# Patient Record
(demographics unavailable — no encounter records)

---

## 2025-01-10 NOTE — PROCEDURE
[Large Joint Injection] : Large joint injection [Bilateral] : bilaterally of the [Knee] : knee [Pain] : pain [Inflammation] : inflammation [X-ray evidence of Osteoarthritis on this or prior visit] : x-ray evidence of Osteoarthritis on this or prior visit [Alcohol] : alcohol [Betadine] : betadine [Ethyl Chloride sprayed topically] : ethyl chloride sprayed topically [Sterile technique used] : sterile technique used [___ cc    0.5%] : Bupivacaine (Marcaine) ~Vcc of 0.5%  [___ cc    40mg] : Triamcinolone (Kenalog) ~Vcc of 40 mg  [Call if redness, pain or fever occur] : call if redness, pain or fever occur [Apply ice for 15min out of every hour for the next 12-24 hours as tolerated] : apply ice for 15 minutes out of every hour for the next 12-24 hours as tolerated [Previous OTC use and PT nontherapeutic] : patient has tried OTC's including aspirin, Ibuprofen, Aleve, etc or prescription NSAIDS, and/or exercises at home and/or physical therapy without satisfactory response [Patient had decreased mobility in the joint] : patient had decreased mobility in the joint [Risks, benefits, alternatives discussed / Verbal consent obtained] : the risks benefits, and alternatives have been discussed, and verbal consent was obtained

## 2025-01-13 NOTE — ASSESSMENT
[FreeTextEntry1] : 67F p/w george knee and R shoulder OA  CSI BL knees  will get auth for gel series return to begin  The risks, benefits, contents and alternatives to injection were explained in full to the patient. Risks outlined include but are not limited to infection, sepsis, bleeding, scarring, skin discoloration, temporary increase in pain, syncopal episode, failure to resolve symptoms, allergic reaction, flare reaction, permanent white skin discoloration, symptom recurrence, and elevation of blood sugar in diabetics. Patient understood the risks. All questions were answered. After discussion of options, patient requested an injection. Oral informed consent was obtained and sterile prep was done of the injection site. Sterile technique was used to introduce the mixture. Patient tolerated the procedure well. Patient advised to ice the injection site this evening. Signs and symptoms of infection reviewed and patient advised to call immediately for redness, fevers, and/or chills.

## 2025-01-13 NOTE — HISTORY OF PRESENT ILLNESS
[8] : 8 [7] : 7 [de-identified] : 7/1/22: Euflexxa #1 BL knees  AJ KERVIN 67 year old F here for eval of Juan M-knees, Pt state they feel better since last visit. Decrease in pain 67F f/u juan m knees, last seen in feb and wanted another round of visco but was too soon. She would like to  try another CSI    01/10/2025: pt is here for follow up of LT knee pain. States she is still having pain within the knee [FreeTextEntry1] : george knees

## 2025-01-13 NOTE — HISTORY OF PRESENT ILLNESS
[8] : 8 [7] : 7 [de-identified] : 7/1/22: Euflexxa #1 BL knees  AJ KERVIN 67 year old F here for eval of Juan M-knees, Pt state they feel better since last visit. Decrease in pain 67F f/u juan m knees, last seen in feb and wanted another round of visco but was too soon. She would like to  try another CSI    01/10/2025: pt is here for follow up of LT knee pain. States she is still having pain within the knee [FreeTextEntry1] : george knees

## 2025-01-13 NOTE — PHYSICAL EXAM
[Normal Sensation] : normal sensation [Normal Mood and Affect] : normal mood and affect [Oriented] : oriented [Able to Communicate] : able to communicate [Well Developed] : well developed [Well Nourished] : well nourished [Right] : right shoulder [Degenerative change] : Degenerative change [Bilateral] : knee bilaterally [NL (140)] : flexion 140 degrees [NL (0)] : extension 0 degrees [5___] : hamstring 5[unfilled]/5 [] : negative Lachmann

## 2025-01-31 NOTE — DISCUSSION/SUMMARY
[de-identified] : The patient was advised of the diagnosis.  The natural history of the pathology was explained in full to the patient in layman's terms. All questions were answered.  The risks and benefits of surgical and non-surgical treatment alternatives were explained in full to the patient.  The natural progression of Osteoarthritis was explained to the patient.  We discussed the possible treatment options from conservative to operative.  These included NSAIDS, Glucosamine and Chondrotin sulfate, and Physical Therapy as well different types of injections.  We also discussed that at some point they may progress to needed a TKA.  Information and pamphlets were given.  The risks, benefits, contents and alternatives to injection were explained in full to the patient.  Risks outlined include but are not limited to infection, sepsis, bleeding, scarring, skin discoloration, temporary increase in pain, syncopal episode, failure to resolve symptoms, allergic reaction, flare reaction, symptom recurrence. Patient understood the risks.  All questions were answered. Oral informed consent was obtained and sterile prep was done of the injection site.  Sterile technique was used to introduce the mixture.  Patient tolerated the procedure well.  Patient advised to ice the injection site this evening.  Signs and symptoms of infection reviewed and patient advised to call immediately for redness, fevers, and/or chills.

## 2025-01-31 NOTE — PROCEDURE
[FreeTextEntry3] : Large joint injection was performed on the ___LEFT__ knee(s). The indication for this procedure was pain, inflammation and x-ray evidence of Osteoarthritis on this or prior visit. The site was prepped with alcohol, betadine, ethyl chloride sprayed topically and sterile technique used. An injection of Lidocaine 3cc of 1% , 20mg of Euflexxa, series #1 was used.  Patient tolerated procedure well. Patient was advised to call if redness, pain or fever occur, apply ice for 15 minutes out of every hour for the next 12-24 hours as tolerated and patient was advised to rest the joint(s) for 1-2 days.   Patient has tried OTC's including aspirin, Ibuprofen, Aleve, etc or prescription NSAIDS, and/or exercises at home and/or physical therapy without satisfactory response, patient had decreased mobility in the joint and the risks benefits, and alternatives have been discussed, and verbal consent was obtained.

## 2025-01-31 NOTE — ASSESSMENT
[FreeTextEntry1] : 69F p/w george knee and R shoulder OA  Euflexxa #1 L knee today, tolerated well Will request auth for Euflexxa R knee  f/up 1 week to continue   Progress Note completed by Rahel Galeano PA-C The KIZZY assigned on this date is under my supervision and saw this patient independently on this visit. I was in the office suite at the time.  I have periodically reviewed the patient chart as needed and I continue to oversee the medical decision making and care. -Dr. Smith

## 2025-01-31 NOTE — HISTORY OF PRESENT ILLNESS
[8] : 8 [7] : 7 [de-identified] : 7/1/22: Euflexxa #1 BL knees  AJ KERVIN 67 year old F here for eval of Juan M-knees, Pt state they feel better since last visit. Decrease in pain 67F f/u juan m knees, last seen in feb and wanted another round of visco but was too soon. She would like to  try another CSI    01/10/2025: pt is here for follow up of LT knee pain. States she is still having pain within the knee  1/31/25: Patient is here today for injection #1 L knee. [FreeTextEntry1] : george knees

## 2025-02-07 NOTE — HISTORY OF PRESENT ILLNESS
[8] : 8 [7] : 7 [de-identified] : 7/1/22: Euflexxa #1 BL knees  AJ GALEANA 67 year old F here for eval of Juan M-knees, Pt state they feel better since last visit. Decrease in pain 67F f/u juan m knees, last seen in feb and wanted another round of visco but was too soon. She would like to  try another CSI    01/10/2025: pt is here for follow up of LT knee pain. States she is still having pain within the knee  1/31/25: Patient is here today for injection #1 L knee.  02/07/25: Patient is here today for injection #2 L knee. Starting #1 right [FreeTextEntry1] : george knees

## 2025-02-07 NOTE — DISCUSSION/SUMMARY
[de-identified] : The patient was advised of the diagnosis.  The natural history of the pathology was explained in full to the patient in layman's terms. All questions were answered.  The risks and benefits of surgical and non-surgical treatment alternatives were explained in full to the patient.  The natural progression of Osteoarthritis was explained to the patient.  We discussed the possible treatment options from conservative to operative.  These included NSAIDS, Glucosamine and Chondrotin sulfate, and Physical Therapy as well different types of injections.  We also discussed that at some point they may progress to needed a TKA.  Information and pamphlets were given.  The risks, benefits, contents and alternatives to injection were explained in full to the patient.  Risks outlined include but are not limited to infection, sepsis, bleeding, scarring, skin discoloration, temporary increase in pain, syncopal episode, failure to resolve symptoms, allergic reaction, flare reaction, symptom recurrence. Patient understood the risks.  All questions were answered. Oral informed consent was obtained and sterile prep was done of the injection site.  Sterile technique was used to introduce the mixture.  Patient tolerated the procedure well.  Patient advised to ice the injection site this evening.  Signs and symptoms of infection reviewed and patient advised to call immediately for redness, fevers, and/or chills.

## 2025-02-07 NOTE — PROCEDURE
[Large Joint Injection] : Large joint injection [Bilateral] : bilaterally of the [Knee] : knee [Pain] : pain [X-ray evidence of Osteoarthritis on this or prior visit] : x-ray evidence of Osteoarthritis on this or prior visit [Alcohol] : alcohol [Betadine] : betadine [Ethyl Chloride sprayed topically] : ethyl chloride sprayed topically [Sterile technique used] : sterile technique used [Euflexxa(20mg)] : 20mg of Euflexxa [Call if redness, pain or fever occur] : call if redness, pain or fever occur [Apply ice for 15min out of every hour for the next 12-24 hours as tolerated] : apply ice for 15 minutes out of every hour for the next 12-24 hours as tolerated [Previous OTC use and PT nontherapeutic] : patient has tried OTC's including aspirin, Ibuprofen, Aleve, etc or prescription NSAIDS, and/or exercises at home and/or physical therapy without satisfactory response [Risks, benefits, alternatives discussed / Verbal consent obtained] : the risks benefits, and alternatives have been discussed, and verbal consent was obtained

## 2025-02-07 NOTE — ASSESSMENT
[FreeTextEntry1] : 69F p/w george knee and R shoulder OA  Euflexxa #2 L knee and Right #1 today, tolerated well  f/up 1 week to continue

## 2025-02-21 NOTE — ASSESSMENT
[FreeTextEntry1] : 69F p/w george knee and R shoulder OA  Euflexxa #3 L knee and Right #2 today, tolerated well  f/up 1 week to continue

## 2025-02-21 NOTE — PROCEDURE
[Large Joint Injection] : Large joint injection [Bilateral] : bilaterally of the [Knee] : knee [Pain] : pain [Inflammation] : inflammation [Alcohol] : alcohol [Betadine] : betadine [Ethyl Chloride sprayed topically] : ethyl chloride sprayed topically [Euflexxa(20mg)] : 20mg of Euflexxa [] : Patient tolerated procedure well [de-identified] : #3 right knee, #2 left knee.

## 2025-02-21 NOTE — DISCUSSION/SUMMARY
[de-identified] : The patient was advised of the diagnosis.  The natural history of the pathology was explained in full to the patient in layman's terms. All questions were answered.  The risks and benefits of surgical and non-surgical treatment alternatives were explained in full to the patient.  The natural progression of Osteoarthritis was explained to the patient.  We discussed the possible treatment options from conservative to operative.  These included NSAIDS, Glucosamine and Chondrotin sulfate, and Physical Therapy as well different types of injections.  We also discussed that at some point they may progress to needed a TKA.  Information and pamphlets were given.  The risks, benefits, contents and alternatives to injection were explained in full to the patient.  Risks outlined include but are not limited to infection, sepsis, bleeding, scarring, skin discoloration, temporary increase in pain, syncopal episode, failure to resolve symptoms, allergic reaction, flare reaction, symptom recurrence. Patient understood the risks.  All questions were answered. Oral informed consent was obtained and sterile prep was done of the injection site.  Sterile technique was used to introduce the mixture.  Patient tolerated the procedure well.  Patient advised to ice the injection site this evening.  Signs and symptoms of infection reviewed and patient advised to call immediately for redness, fevers, and/or chills.

## 2025-02-21 NOTE — HISTORY OF PRESENT ILLNESS
[8] : 8 [7] : 7 [de-identified] : 7/1/22: Euflexxa #1 BL knees  AJ GALEANA 67 year old F here for eval of Juan M-knees, Pt state they feel better since last visit. Decrease in pain 67F f/u juan m knees, last seen in feb and wanted another round of visco but was too soon. She would like to  try another CSI    01/10/2025: pt is here for follow up of LT knee pain. States she is still having pain within the knee  1/31/25: Patient is here today for injection #1 L knee.  02/07/25: Patient is here today for injection #2 L knee. Starting #1 right  2/21/2025: Patient is here today for injection #3 L knee, #2 R knee of euflexxa. [FreeTextEntry1] : george knees

## 2025-02-28 NOTE — ASSESSMENT
[FreeTextEntry1] : 69F p/w george knee and R shoulder OA  Euflexxa #3 R Knee, tolerated well  f/up 6 weeks

## 2025-02-28 NOTE — HISTORY OF PRESENT ILLNESS
[8] : 8 [7] : 7 [de-identified] : 7/1/22: Euflexxa #1 BL knees  AJ GALEANA 67 year old F here for eval of Juan M-knees, Pt state they feel better since last visit. Decrease in pain 67F f/u juan m knees, last seen in feb and wanted another round of visco but was too soon. She would like to  try another CSI    01/10/2025: pt is here for follow up of LT knee pain. States she is still having pain within the knee  1/31/25: Patient is here today for injection #1 L knee.  02/07/25: Patient is here today for injection #2 L knee. Starting #1 right  2/21/2025: Patient is here today for injection #3 L knee, #2 R knee of euflexxa.  2/28/2025: Patient is here today for injection #3 R knee euflexxa. [FreeTextEntry1] : george knees

## 2025-02-28 NOTE — DISCUSSION/SUMMARY
[de-identified] : The patient was advised of the diagnosis.  The natural history of the pathology was explained in full to the patient in layman's terms. All questions were answered.  The risks and benefits of surgical and non-surgical treatment alternatives were explained in full to the patient.  The natural progression of Osteoarthritis was explained to the patient.  We discussed the possible treatment options from conservative to operative.  These included NSAIDS, Glucosamine and Chondrotin sulfate, and Physical Therapy as well different types of injections.  We also discussed that at some point they may progress to needed a TKA.  Information and pamphlets were given.  The risks, benefits, contents and alternatives to injection were explained in full to the patient.  Risks outlined include but are not limited to infection, sepsis, bleeding, scarring, skin discoloration, temporary increase in pain, syncopal episode, failure to resolve symptoms, allergic reaction, flare reaction, symptom recurrence. Patient understood the risks.  All questions were answered. Oral informed consent was obtained and sterile prep was done of the injection site.  Sterile technique was used to introduce the mixture.  Patient tolerated the procedure well.  Patient advised to ice the injection site this evening.  Signs and symptoms of infection reviewed and patient advised to call immediately for redness, fevers, and/or chills.

## 2025-04-11 NOTE — HISTORY OF PRESENT ILLNESS
[de-identified] : AJ GALEANA is here for evaluation of her right heel/lower leg. Denies trauma. Reports pain since early April 2025. WB in sneakers. No formal treatment to date. No previous injury/problem with right foot or lumbar spine.  [FreeTextEntry1] : Right heel

## 2025-04-11 NOTE — DISCUSSION/SUMMARY
[de-identified] : No primary foot/ankle pathology. Her exam is consistent with lumbar radiculopathy.  Recommend a course of PT.  Physical therapy was prescribed for 2-3 times per week for four weeks.  The patient was prescribed meloxicam 15mg daily as needed for pain.  They were explained the potntial risks of GI pain/bleeding as well as hypertension.  They were told not to take any other nsaids while taking this medication.  Recommend consult with [facility] pain management.

## 2025-04-11 NOTE — PHYSICAL EXAM
[NL (40)] : plantar flexion 40 degrees [NL 30)] : inversion 30 degrees [NL (20)] : eversion 20 degrees [5___] : eversion 5[unfilled]/5 [2+] : posterior tibialis pulse: 2+ [Normal] : saphenous nerve sensation normal [Diminished] : patella reflex diminished [] : non-antalgic [Right] : right foot [Weight -] : weightbearing [de-identified] :  AP, lateral and oblique xray views were taken and reviewed today. Achilles insertion spur.

## 2025-05-16 NOTE — HISTORY OF PRESENT ILLNESS
[10] : 10 [] : yes [de-identified] : 05/16/2025: 71yo female presenting for R heel pain. No injury/fall. Experiencing constant sharp pain for 5 weeks. Spoke with Dr Murray on 4/11/25- x-ray R foot performed and recommenced Meloxicam/PT. Took medication with some relief, did not attend PT.   PMH: denied Allergies: NKDA [FreeTextEntry1] : R foot

## 2025-05-16 NOTE — IMAGING
[de-identified] : right ankle / foot no skin changes ttp over plantar fascia gait is wnl strength is 5/5 all planes all digits are nvi no ligamentous laxity to the right ankle no pain with forefoot compression 5/5 ehl/fhl strength   Right calcaneus and lateral ankle xray showed no acute bony pathology. early ankle OA noted. traction enthesophytes noted.

## 2025-05-16 NOTE — ASSESSMENT
[FreeTextEntry1] : The patient was advised of the diagnosis. The natural history of the pathology was explained in full to the patient in layman's terms. All questions were answered. The risks and benefits of surgical and non-surgical treatment alternatives were explained in full to the patient.  recommend tuli heel cups PT rx provided. continue Mobic 15mg/d prn pain. rto in 2-3 weeks for f/u care.